# Patient Record
Sex: MALE | HISPANIC OR LATINO
[De-identification: names, ages, dates, MRNs, and addresses within clinical notes are randomized per-mention and may not be internally consistent; named-entity substitution may affect disease eponyms.]

---

## 2023-02-28 ENCOUNTER — APPOINTMENT (OUTPATIENT)
Dept: ORTHOPEDIC SURGERY | Facility: CLINIC | Age: 28
End: 2023-02-28
Payer: MEDICAID

## 2023-02-28 PROBLEM — Z00.00 ENCOUNTER FOR PREVENTIVE HEALTH EXAMINATION: Status: ACTIVE | Noted: 2023-02-28

## 2023-02-28 PROCEDURE — 73030 X-RAY EXAM OF SHOULDER: CPT | Mod: RT

## 2023-02-28 PROCEDURE — 99203 OFFICE O/P NEW LOW 30 MIN: CPT

## 2023-02-28 NOTE — HISTORY OF PRESENT ILLNESS
[de-identified] : Pt here for evaluation for right shoulder pain. \par \par He states that he had an operation to his right shoulder in [facility] approximately 1 year ago and they was supposed to remove the hardware.  However, [place] and still has a hardware in his right shoulder.  He states he had an injury to his right clavicle and AC joint.\par \par He is having significant pain over the incisional site and palpable hardware\par \par NAD\par Right shoulder:\par Incision over distal clavicle and AC joint well-healed\par Palpable hardware superior to ac joint\par Pain with ROM\par Negative instability\par \par XRay right shoulder shows cerclage wiring from the acromion through to the distal clavicle with a broken K wires crossing the AC joint\par \par Plan\par Through an  I went over findings with the patient and his family.  I explained that he needs to hardware removed as this is causing him significant pain.  However he wants this done as soon as possible.  I explained to him that I am more than happy to provide the treatment but due to his surgical booking I am unable to give him a specific date and couple weeks.  He is interested in seeing a second opinion to see if it could be done sooner.  He will have a follow-up appointment with either Dr. Chavis or Dr. Castillo to see if they can accommodate him sooner for the procedure

## 2023-03-08 ENCOUNTER — APPOINTMENT (OUTPATIENT)
Dept: ORTHOPEDIC SURGERY | Facility: CLINIC | Age: 28
End: 2023-03-08

## 2023-03-15 ENCOUNTER — APPOINTMENT (OUTPATIENT)
Dept: ORTHOPEDIC SURGERY | Facility: CLINIC | Age: 28
End: 2023-03-15
Payer: MEDICAID

## 2023-03-15 DIAGNOSIS — M25.511 PAIN IN RIGHT SHOULDER: ICD-10-CM

## 2023-03-15 PROCEDURE — 99214 OFFICE O/P EST MOD 30 MIN: CPT

## 2023-03-17 ENCOUNTER — TRANSCRIPTION ENCOUNTER (OUTPATIENT)
Age: 28
End: 2023-03-17

## 2023-03-17 ENCOUNTER — OUTPATIENT (OUTPATIENT)
Dept: INPATIENT UNIT | Facility: HOSPITAL | Age: 28
LOS: 1 days | Discharge: HOME | End: 2023-03-17
Payer: MEDICAID

## 2023-03-17 ENCOUNTER — APPOINTMENT (OUTPATIENT)
Dept: ORTHOPEDIC SURGERY | Facility: HOSPITAL | Age: 28
End: 2023-03-17
Payer: MEDICAID

## 2023-03-17 VITALS
OXYGEN SATURATION: 98 % | SYSTOLIC BLOOD PRESSURE: 121 MMHG | TEMPERATURE: 98 F | HEART RATE: 57 BPM | DIASTOLIC BLOOD PRESSURE: 75 MMHG | RESPIRATION RATE: 18 BRPM

## 2023-03-17 VITALS — OXYGEN SATURATION: 97 % | SYSTOLIC BLOOD PRESSURE: 122 MMHG | DIASTOLIC BLOOD PRESSURE: 74 MMHG | HEART RATE: 61 BPM

## 2023-03-17 DIAGNOSIS — Z98.890 OTHER SPECIFIED POSTPROCEDURAL STATES: Chronic | ICD-10-CM

## 2023-03-17 DIAGNOSIS — T84.84XA PAIN DUE TO INTERNAL ORTHOPEDIC PROSTHETIC DEVICES, IMPLANTS AND GRAFTS, INITIAL ENCOUNTER: ICD-10-CM

## 2023-03-17 PROCEDURE — 20680 REMOVAL OF IMPLANT DEEP: CPT | Mod: RT

## 2023-03-17 PROCEDURE — 87070 CULTURE OTHR SPECIMN AEROBIC: CPT

## 2023-03-17 PROCEDURE — 73030 X-RAY EXAM OF SHOULDER: CPT | Mod: RT

## 2023-03-17 RX ORDER — ACETAMINOPHEN 500 MG
2 TABLET ORAL
Qty: 112 | Refills: 0
Start: 2023-03-17 | End: 2023-03-30

## 2023-03-17 RX ORDER — HYDROMORPHONE HYDROCHLORIDE 2 MG/ML
0.5 INJECTION INTRAMUSCULAR; INTRAVENOUS; SUBCUTANEOUS
Refills: 0 | Status: DISCONTINUED | OUTPATIENT
Start: 2023-03-17 | End: 2023-03-17

## 2023-03-17 RX ORDER — KETOROLAC TROMETHAMINE 30 MG/ML
1 SYRINGE (ML) INJECTION
Qty: 21 | Refills: 0
Start: 2023-03-17 | End: 2023-03-23

## 2023-03-17 RX ORDER — OXYCODONE HYDROCHLORIDE 5 MG/1
5 TABLET ORAL ONCE
Refills: 0 | Status: DISCONTINUED | OUTPATIENT
Start: 2023-03-17 | End: 2023-03-17

## 2023-03-17 NOTE — ASU DISCHARGE PLAN (ADULT/PEDIATRIC) - ASU DC SPECIAL INSTRUCTIONSFT
keep dressing on and keep clean and dry  take tylenol and ketorolac for pain  no heavy lifting  follow up 3/29/2023

## 2023-03-17 NOTE — H&P ADULT - ASSESSMENT
26 yo male w/ right clavicle broken hardware s/p ac joint separation and surgical fixation in [facility] approx 1 year ago. Here for removal of hardware

## 2023-03-17 NOTE — ASU DISCHARGE PLAN (ADULT/PEDIATRIC) - CARE PROVIDER_API CALL
Santos Chavis)  Orthopaedic Surgery  [address] [place] [address]  Phone: [phone]  Fax: [phone]  Established Patient  Follow Up Time: Routine

## 2023-03-17 NOTE — ASU DISCHARGE PLAN (ADULT/PEDIATRIC) - NS MD DC FALL RISK RISK
For information on Fall & Injury Prevention, visit: [email or web address] OR  [email or web address] OR  https://www.cdc.gov/steadi/patient.html

## 2023-03-17 NOTE — PRE-ANESTHESIA EVALUATION ADULT - WEIGHT IN KG
Chief Complaint   Patient presents with    Physical    Labs     Patient in office today for cpe and fasting labs. Have no concerns. 1. Have you been to the ER, urgent care clinic since your last visit? Hospitalized since your last visit? No    2. Have you seen or consulted any other health care providers outside of the [address] [place] [address] since your last visit? Include any pap smears or colon screening.  No Detail Level: Detailed Depth Of Biopsy: dermis Was A Bandage Applied: Yes Size Of Lesion In Cm: 0.4 X Size Of Lesion In Cm: 0 Biopsy Type: H and E Biopsy Method: Dermablade Anesthesia Type: 1% lidocaine with epinephrine Anesthesia Volume In Cc (Will Not Render If 0): 0.5 74 Hemostasis: Drysol Wound Care: Petrolatum Dressing: bandage Destruction After The Procedure: No Type Of Destruction Used: Curettage Curettage Text: The wound bed was treated with curettage after the biopsy was performed. Cryotherapy Text: The wound bed was treated with cryotherapy after the biopsy was performed. Electrodesiccation Text: The wound bed was treated with electrodesiccation after the biopsy was performed. Electrodesiccation And Curettage Text: The wound bed was treated with electrodesiccation and curettage after the biopsy was performed. Silver Nitrate Text: The wound bed was treated with silver nitrate after the biopsy was performed. Lab: [id number] Lab Facility: [id number] Consent: Written consent was obtained and risks were reviewed including but not limited to scarring, infection, bleeding, scabbing, incomplete removal, nerve damage and allergy to anesthesia. Post-Care Instructions: I reviewed with the patient in detail post-care instructions. Patient is to keep the biopsy site dry overnight, and then apply bacitracin twice daily until healed. Patient may apply hydrogen peroxide soaks to remove any crusting. Notification Instructions: Patient will be notified of biopsy results. However, patient instructed to call the office if not contacted within 2 weeks. Billing Type: Third-Party Bill Size Of Lesion In Cm: 0.3 Lab: [id number] Lab Facility: [id number] Billing Type: Third-Party Bill

## 2023-03-17 NOTE — H&P ADULT - HISTORY OF PRESENT ILLNESS
28 yo male s/p right AC joint separation and surgery 1 year ago in [facility]. Was doing well until 2 weeks ago when he fell and has tenderness and pain to right distal clavicle; xrays demonstrate broken hardware.

## 2023-03-17 NOTE — BRIEF OPERATIVE NOTE - OPERATION/FINDINGS
right clavicle tension band hardware broken preoperatively. Part of the hardware was removed. The broken wires were left in place

## 2023-03-20 LAB
CULTURE RESULTS: SIGNIFICANT CHANGE UP
CULTURE RESULTS: SIGNIFICANT CHANGE UP
SPECIMEN SOURCE: SIGNIFICANT CHANGE UP
SPECIMEN SOURCE: SIGNIFICANT CHANGE UP
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
## 2023-03-21 DIAGNOSIS — T84.298A OTHER MECHANICAL COMPLICATION OF INTERNAL FIXATION DEVICE OF OTHER BONES, INITIAL ENCOUNTER: ICD-10-CM
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
## 2023-03-27 ENCOUNTER — APPOINTMENT (OUTPATIENT)
Dept: ORTHOPEDIC SURGERY | Facility: CLINIC | Age: 28
End: 2023-03-27
Payer: MEDICAID

## 2023-03-27 PROCEDURE — 99024 POSTOP FOLLOW-UP VISIT: CPT

## 2023-03-27 PROCEDURE — 73030 X-RAY EXAM OF SHOULDER: CPT | Mod: RT

## 2023-03-28 PROBLEM — Z78.9 OTHER SPECIFIED HEALTH STATUS: Chronic | Status: ACTIVE | Noted: 2023-03-17

## 2023-04-10 ENCOUNTER — APPOINTMENT (OUTPATIENT)
Dept: ORTHOPEDIC SURGERY | Facility: CLINIC | Age: 28
End: 2023-04-10

## 2023-05-01 ENCOUNTER — APPOINTMENT (OUTPATIENT)
Dept: INTERNAL MEDICINE | Facility: CLINIC | Age: 28
End: 2023-05-01

## 2023-06-28 ENCOUNTER — NON-APPOINTMENT (OUTPATIENT)
Age: 28
End: 2023-06-28

## 2023-06-29 ENCOUNTER — APPOINTMENT (OUTPATIENT)
Dept: UROLOGY | Facility: CLINIC | Age: 28
End: 2023-06-29

## 2023-07-31 NOTE — ASU PATIENT PROFILE, ADULT - ACCEPTABLE
Patient Reported symptoms:    Right leg   Heaviness Some of the time   Achiness All of the time  Swelling A little of the time   Throbbing None of the time   Itching None of the time   Appearance Very noticeable   Impact on work/activities Moderately reduced    Left Leg   Heaviness Some of the time   Achiness Some of the time   Swelling A little of the time   Throbbing None of the time   Itching None of the time   Appearance Very noticeable   Impact on work/activities Moderately reduced    0

## 2024-01-20 ENCOUNTER — EMERGENCY (EMERGENCY)
Facility: HOSPITAL | Age: 29
LOS: 0 days | Discharge: HOME | End: 2024-01-21
Attending: EMERGENCY MEDICINE
Payer: COMMERCIAL

## 2024-01-20 VITALS
RESPIRATION RATE: 18 BRPM | TEMPERATURE: 99 F | WEIGHT: 154.32 LBS | OXYGEN SATURATION: 97 % | HEART RATE: 72 BPM | DIASTOLIC BLOOD PRESSURE: 70 MMHG | SYSTOLIC BLOOD PRESSURE: 131 MMHG

## 2024-01-20 DIAGNOSIS — J02.9 ACUTE PHARYNGITIS, UNSPECIFIED: ICD-10-CM

## 2024-01-20 DIAGNOSIS — R04.2 HEMOPTYSIS: ICD-10-CM

## 2024-01-20 DIAGNOSIS — Z98.890 OTHER SPECIFIED POSTPROCEDURAL STATES: Chronic | ICD-10-CM

## 2024-01-20 PROCEDURE — 71046 X-RAY EXAM CHEST 2 VIEWS: CPT | Mod: 26

## 2024-01-20 PROCEDURE — 99283 EMERGENCY DEPT VISIT LOW MDM: CPT | Mod: 25

## 2024-01-20 PROCEDURE — 99284 EMERGENCY DEPT VISIT MOD MDM: CPT

## 2024-01-20 PROCEDURE — 71046 X-RAY EXAM CHEST 2 VIEWS: CPT

## 2024-01-20 RX ORDER — DEXAMETHASONE 0.5 MG/5ML
10 ELIXIR ORAL ONCE
Refills: 0 | Status: COMPLETED | OUTPATIENT
Start: 2024-01-20 | End: 2024-01-20

## 2024-01-20 RX ADMIN — Medication 10 MILLIGRAM(S): at 21:46

## 2024-01-20 NOTE — ED PROVIDER NOTE - ATTENDING CONTRIBUTION TO CARE
20-year-old male to the ED with sore throat and cough with bloody sputum over the past few days.  Wife is admitted to the hospital with similar symptoms.  No sick contacts or travels or trauma.  No smoking history and no bleeding from any other sites.  On exam patient has significantly reddened posterior pharynx with pain over bilateral tonsils and redness

## 2024-01-20 NOTE — ED PROVIDER NOTE - PATIENT PORTAL LINK FT
You can access the FollowMyHealth Patient Portal offered by [facility] by registering at the following website: [email or web address]. By joining [employer]’s FollowMyHealth portal, you will also be able to view your health information using other applications (apps) compatible with our system.

## 2024-01-20 NOTE — ED PROVIDER NOTE - PHYSICAL EXAMINATION
VITAL SIGNS: I have reviewed nursing notes and confirm.  CONSTITUTIONAL: well-appearing, non-toxic, NAD  SKIN: Warm dry, normal skin turgor  HEAD: NCAT  EYES: EOMI, PERRLA, no scleral icterus  ENT: Moist mucous membranes, normal pharynx with no exudates. (+) erythema  NECK: Supple; non tender. Full ROM.   CARD: RRR, no murmurs, rubs or gallops  RESP: clear to ausculation b/l.  No rales, rhonchi, or wheezing.  ABD: soft, + BS, non-tender, non-distended, no rebound or guarding. No CVA tenderness.  EXT: Full ROM, no bony tenderness, no pedal edema, no calf tenderness  NEURO: normal motor. normal sensory. CN II-XII intact. Normal gait.  PSYCH: Cooperative, appropriate. VITAL SIGNS: I have reviewed nursing notes and confirm.  CONSTITUTIONAL: well-appearing, non-toxic, NAD  SKIN: Warm dry, normal skin turgor  HEAD: NCAT  EYES: EOMI, PERRLA, no scleral icterus  ENT: Moist mucous membranes, normal pharynx with no exudates. (+) erythema in posterior pharynx  NECK: Supple; non tender. Full ROM.   CARD: RRR, no murmurs, rubs or gallops  RESP: clear to ausculation b/l.  No rales, rhonchi, or wheezing.  ABD: soft, + BS, non-tender, non-distended, no rebound or guarding. No CVA tenderness.  EXT: Full ROM, no bony tenderness, no pedal edema, no calf tenderness  NEURO: normal motor. normal sensory. CN II-XII intact. Normal gait.  PSYCH: Cooperative, appropriate.

## 2024-01-20 NOTE — ED PROVIDER NOTE - OBJECTIVE STATEMENT
Patient is a 28-year-old male no past medical history presenting for evaluation for sore throat for the last 2 days.  Patient states that he has also had episodes of hemoptysis.  He denies any recent travel, prolonged travel, shortness of breath, difficulty breathing, fevers, chills, nausea, vomiting.  Patient has been using Tylenol with minimal relief. [facility] #[id number]    Patient is a 28-year-old male no past medical history presenting for evaluation for sore throat for the last 2 days.  Patient states that he has also had episodes of hemoptysis.  He denies any recent travel, prolonged travel, shortness of breath, difficulty breathing, fevers, chills, nausea, vomiting.  Patient has been using Tylenol with minimal relief.

## 2024-03-13 ENCOUNTER — APPOINTMENT (OUTPATIENT)
Dept: UROLOGY | Facility: CLINIC | Age: 29
End: 2024-03-13

## 2024-08-01 ENCOUNTER — EMERGENCY (EMERGENCY)
Facility: HOSPITAL | Age: 29
LOS: 0 days | Discharge: HOME | End: 2024-08-01
Attending: EMERGENCY MEDICINE
Payer: SELF-PAY

## 2024-08-01 VITALS
SYSTOLIC BLOOD PRESSURE: 129 MMHG | RESPIRATION RATE: 19 BRPM | DIASTOLIC BLOOD PRESSURE: 66 MMHG | OXYGEN SATURATION: 98 % | HEART RATE: 69 BPM | TEMPERATURE: 98 F

## 2024-08-01 DIAGNOSIS — Z98.890 OTHER SPECIFIED POSTPROCEDURAL STATES: Chronic | ICD-10-CM

## 2024-08-01 DIAGNOSIS — M54.2 CERVICALGIA: ICD-10-CM
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
## 2024-08-01 DIAGNOSIS — R20.0 ANESTHESIA OF SKIN: ICD-10-CM

## 2024-08-01 PROCEDURE — 99284 EMERGENCY DEPT VISIT MOD MDM: CPT

## 2024-08-01 PROCEDURE — 72125 CT NECK SPINE W/O DYE: CPT | Mod: MC

## 2024-08-01 PROCEDURE — 72125 CT NECK SPINE W/O DYE: CPT | Mod: 26,MC

## 2024-08-01 PROCEDURE — 96372 THER/PROPH/DIAG INJ SC/IM: CPT

## 2024-08-01 PROCEDURE — 99284 EMERGENCY DEPT VISIT MOD MDM: CPT | Mod: 25

## 2024-08-01 RX ORDER — TIZANIDINE HCL 2 MG
2 TABLET ORAL
Qty: 18 | Refills: 0
Start: 2024-08-01 | End: 2024-08-03

## 2024-08-01 RX ORDER — KETOROLAC TROMETHAMINE 30 MG/ML
30 INJECTION, SOLUTION INTRAMUSCULAR ONCE
Refills: 0 | Status: DISCONTINUED | OUTPATIENT
Start: 2024-08-01 | End: 2024-08-01

## 2024-08-01 RX ORDER — METHOCARBAMOL 500 MG
1500 TABLET ORAL ONCE
Refills: 0 | Status: COMPLETED | OUTPATIENT
Start: 2024-08-01 | End: 2024-08-01

## 2024-08-01 RX ORDER — DEXAMETHASONE 1 MG/1
6 TABLET ORAL ONCE
Refills: 0 | Status: COMPLETED | OUTPATIENT
Start: 2024-08-01 | End: 2024-08-01

## 2024-08-01 RX ADMIN — Medication 1500 MILLIGRAM(S): at 20:54

## 2024-08-01 RX ADMIN — DEXAMETHASONE 6 MILLIGRAM(S): 1 TABLET ORAL at 20:54

## 2024-08-01 RX ADMIN — KETOROLAC TROMETHAMINE 30 MILLIGRAM(S): 30 INJECTION, SOLUTION INTRAMUSCULAR at 20:55

## 2024-08-01 NOTE — ED ADULT NURSE NOTE - NSSUHOSCREENINGYN_ED_ALL_ED
Thank you for choosing our [facility] or [facility]  [facility] practice. We are committed to your medical treatment and  care. If you need to reschedule or cancel your surgery or follow up  appointment, please call the surgery scheduler at [phone]. INSTRUCTIONS FOR SURGERY BMT    Nothing to eat or drink after midnight the night before surgery unless surgery is at [facility] or otherwise instructed by the hospital.    DO NOT TAKE ANY ASPIRIN PRODUCTS 7 days prior to surgery-unless required by your cardiologist or primary care physician. Tylenol only. No Advil, Motrin, Aleve, or Ibuprofen    Any illegal drugs in your system (including Marijuana even if legally prescribed) will result in your surgery being cancelled. Please be sure to check with our office or the hospital on time frame for the drugs to be out of your system. Should your insurance change at any time you must contact our office. Failure to do so may result in your surgery being rescheduled. If you need paperwork filled out for work, you must give the office 2 weeks to complete and submit the forms. [facility]), [address], [facility], [facility] will call you the day prior to your surgery and give you further instructions, if any questions call them at [phone].      ? Pre-Surgery/Anesthesia Video ([facility] ONLY)  Located on [facility]  Steps to locate video online:  1. Scroll over Health Information  1. [employer]  2. [employer]  1. Your Child and Anesthesia  2.  [address] Restrictions ([facility] ONLY)   Food Type Stop Prior to Surgery   Solid Food/Milk Products 8 Hours   Formula 6 Hours   Breast Milk 4 Hours   Clear Liquids   (Water, Gatorade, Pedialtye) 2 Hours Yes - the patient is able to be screened

## 2024-08-01 NOTE — ED PROVIDER NOTE - OBJECTIVE STATEMENT
28 yo M with no pmhx presenting for evaluation of left neck pain radiating to left arm associated with numbness x 2 weeks. Symptoms started after he was lifting heavy object on his neck while at work. No falls. No weakness. No urinary or bowel incontinence. No cp, sob, dizziness, fevers, or chills.     : Rachelle ID #[id number]

## 2024-08-01 NOTE — ED PROVIDER NOTE - CARE PROVIDER_API CALL
Marco A Yee  Neurosurgery  [address] [place] [address], [address]  [place] [address]  Phone: [phone]  Fax: [phone]  Follow Up Time: 4-6 Days

## 2024-08-01 NOTE — ED PROVIDER NOTE - CLINICAL SUMMARY MEDICAL DECISION MAKING FREE TEXT BOX
30 yo M with pmh of right shoulder surgery presents to the ER for neck pain radiating down the left arm associated with numbness x 15 days. Pt states he and another coworker were lifting something heavy, the coworker started to hold it shakily where most of weight was placed onto the patient. Pt bending head and neck to brace this object, and since then he's had the pain. Took Tylenol with no relief. No weakness/incontinence/gait abnormality/cp/sob/HA/dizziness/abdomen pain/rash. History and exam cw with radicular type of pain. Checked CT C-spine scan given hx, given dexamethasone, ketorolac and robaxin with some improvement of discomfort. Will refer to neurosurgery as outpt for further eval and dc with tizanidine+ibuprofen. Return precautions provided.

## 2024-08-01 NOTE — ED PROVIDER NOTE - NSFOLLOWUPINSTRUCTIONS_ED_ALL_ED_FT
Nuestros coordinadores de referencias del departamento de emergencias se comunicarán con usted en las próximas 24 a  48 horas de 9:00 a. m. a 5:00 p. m. (de lunes a viernes) con sohail lorie de seguimiento. Espere sohail llamada telefónica del hospital en bebe período de tiempo. Si no recibe sohail llamada o si tiene alguna pregunta o inquietud, puede comunicarse con demondos al [phone].     Radiculopatía cervical  Cervical Radiculopathy  Close-up of the nerves of the cervical spine.  La radiculopatía cervical se presenta cuando un nervio del sriram (un nervio cervical) está comprimido o dañado. Esta afección puede ocurrir debido a sohail lesión en la columna vertebral cervical (vértebras) del sriram, o azul parte del proceso de envejecimiento normal. La compresión de los nervios cervicales puede causar dolor o adormecimiento que se extiende desde el sriram hasta el brazo y los dedos de la mano. Esta afección generalmente mejora con reposo. Si no mejora, deidra vez sea necesario administrar un tratamiento.    ¿Cuáles son las causas?  Esta afección puede ser causada por lo siguiente:  Lesión en el sriram.  Un abombamiento (hernia) discal.  Espasmos musculares.  Rigidez de los músculos del sriram debido al uso excesivo.  Artritis.  Fractura o degeneración de los huesos y las articulaciones de la columna (espondiloartrosis) debido al envejecimiento.  Espolones óseos que pueden formarse cerca de los nervios cervicales.  ¿Cuáles son los signos o síntomas?  Los síntomas de esta afección incluyen:  Dolor. El dolor puede extenderse desde el sriram hasta el brazo y la mano. El dolor puede ser intenso o molesto. Puede empeorar cuando mueve el sriram.  Adormecimiento u hormigueo en el brazo o la mano.  Debilidad en el brazo y la mano afectados, en casos graves.  ¿Cómo se diagnostica?  Esta afección se puede diagnosticar en función de los síntomas, la historia clínica y los antecedentes médicos. También pueden hacerle estudios, que incluyen los siguientes:  Radiografías.  Exploración por tomografía computarizada (TC).  Resonancia magnética (RM).  Electromiograma (EMG).  Pruebas de conducción nerviosa.  ¿Cómo se trata?  En muchos casos, no se requiere tratamiento para esta afección. Con reposo, esta suele mejorar con el tiempo. Si es necesario administrar tratamiento, las opciones pueden incluir lo siguiente:  Usar un collarín cervical blando shawn períodos cortos.  Hacer fisioterapia para fortalecer los músculos del sriram.  Usar medicamentos. Estos pueden incluir antiinflamatorios no esteroideos (SONIA), azul ibuprofeno, o corticoesteroides orales.  Aplicarse inyecciones en la columna vertebral, en los casos graves.  Someterse a sohail cirugía. [place] puede ser necesario si otros tratamientos no son eficaces. Según la causa de esta afección, podrán implementarse diferentes tipos de cirugía.  Siga estas indicaciones en cagle casa:  Si tiene un collarín cervical:    Úselo azul se lo haya indicado el médico. Quíteselo solamente azul se lo haya indicado el médico.  Pregúntele al médico si puede quitarse el collarín cervical para bañarse e higienizarse. Si lo autorizan a quitarse el collarín para bañarse o higienizarse:  Siga las instrucciones del médico acerca de cómo quitarse el collarín de manera alvarez.  Para limpiar el collarín, pásele un paño con agua y jabón suave, y séquelo trae.  Quite las almohadillas desmontables del collarín, si las tiene, cada 1 o 2 días y lávelas a mano con agua y jabón. Déjelas que se sequen por completo antes de volver a ponerlas en el collarín.  Contrólese la piel debajo del collarín para dean si hay irritación o llagas. Si presenta alguna de estas, informe a cagle médico.  Control del dolor    Bag of ice on a towel on the skin.   A heating pad for use on the painful area.  Use los medicamentos de venta juana y los recetados solamente azul se lo haya indicado el médico.  Si se lo indican, aplique hielo sobre la maite afectada. Para hacer esto:  Si tiene un collarín cervical blando, quíteselo azul se lo haya indicado el médico.  Ponga el hielo en sohail bolsa plástica.  Coloque sohail toalla entre la piel y la bolsa.  Aplique el hielo shawn 20 minutos, 2 o 3 veces por día.  Retire el hielo si la piel se pone de color nava brillante. [place] es muy importante. Si no puede sentir dolor, calor o frío, tiene un mayor riesgo de que se dañe la maite.  Si aplicarse hielo no le marla el dolor, intente aplicarse calor. Use la andrew de calor que el médico le recomiende, azul sohail compresa de calor húmedo o sohail almohadilla térmica.  Coloque sohail toalla entre la piel y la andrew de calor.  Aplique calor shawn 20 a 30 minutos.  Retire la andrew de calor si la piel se pone de color nava brillante. [place] es especialmente importante si no puede sentir dolor, calor o frío. Corre un mayor riesgo de sufrir quemaduras.  Intente darse un masaje suave en el sriram y el hombro para ayudar a aliviar los síntomas.  Actividad    Descanse todo lo que sea necesario.  Retome pastor actividades normales azul se lo haya indicado el médico. Pregúntele al médico qué actividades son seguras para usted.  Realice ejercicios de elongación y fortalecimiento azul se lo hayan indicado el médico o el fisioterapeuta.  Es posible que deba evitar levantar objetos. Pregúntele al médico cuánto peso puede levantar sin correr riesgos.  Indicaciones generales    Use sohail almohada plana para dormir.  No conduzca mientras usa un collarín cervical. Si no tiene un collarín cervical, pregúntele al médico si es seguro que conduzca shawn el proceso de curación del sriram.  Pregúntele al médico si el medicamento recetado le impide conducir o usar maquinaria.  No consuma ningún producto que contenga nicotina o tabaco. Estos productos incluyen cigarrillos, tabaco para mascar y aparatos de vapeo, azul los cigarrillos electrónicos. Si necesita ayuda para dejar de consumir estos productos, consulte al médico.  Concurra a todas las visitas de seguimiento. [place] es importante.  Comuníquese con un médico si:  La afección no mejora con tratamiento.  Solicite ayuda de inmediato si:  El dolor se intensifica mucho y no se marla con los medicamentos.  Siente debilidad o adormecimiento en la mano, el brazo, el adri o la pierna.  Tiene fiebre jarvis.  Tiene rigidez de sriram.  Pierde el control de la vejiga o los intestinos (tiene incontinencia).  Tiene dificultad para caminar, mantener el equilibrio o hablar.  Resumen  La radiculopatía cervical se presenta cuando un nervio del sriram está comprimido o dañado.  Un nervio puede pinzarse por un abultamiento discal, artritis, espasmos musculares o sohail lesión en el sriram.  Los síntomas incluyen dolor, hormigueo o adormecimiento que se irradia desde el sriram hacia el brazo o la mano. En los casos graves, también puede presentarse debilidad.  El tratamiento puede incluir reposo, fisioterapia y usar un collarín cervical. Pueden recetarle medicamentos para aliviar el dolor. En casos graves, deidra vez haya que aplicar inyecciones o realizar sohail cirugía.  Esta información no tiene azul fin reemplazar el consejo del médico. Asegúrese de hacerle al médico cualquier pregunta que tenga.

## 2024-08-01 NOTE — ED ADULT NURSE NOTE - OBJECTIVE STATEMENT
pt presented to ED c/o L neck pain after lifting furniture. pt c/o pain when turning neck to the L side. denies any sob, chest pain or N/V/D

## 2024-08-01 NOTE — ED PROVIDER NOTE - PATIENT PORTAL LINK FT
You can access the FollowMyHealth Patient Portal offered by [facility] by registering at the following website: [email or web address]. By joining [employer]’s FollowMyHealth portal, you will also be able to view your health information using other applications (apps) compatible with our system.

## 2024-08-01 NOTE — ED ADULT NURSE NOTE - CCCP TRG CHIEF CMPLNT
Mother called, Keanu Altman has been having left shin pain for weeks without any improvement. He has been very active in cross country and soccer. They have been doing stretches, using bands etc without any improvement. Concerned about stress fracture. Xray ordered of left shin. neck pain

## 2024-08-01 NOTE — ED PROVIDER NOTE - PHYSICAL EXAMINATION
VITAL SIGNS: I have reviewed nursing notes and confirm.  CONSTITUTIONAL: Patient is in no acute distress.  SKIN: Skin exam is warm and dry, no acute rash.  HEAD: Normocephalic; atraumatic.  EYES: PERRL, EOM intact; conjunctiva and sclera clear.  ENT: No nasal discharge; airway clear.   NECK: Supple; non tender.  CARD: S1, S2 normal; no murmurs, gallops, or rubs. Regular rate and rhythm.  RESP: Clear to auscultation bilaterally. No wheezes, rales or rhonchi.  ABD: Normal bowel sounds; soft; non-distended; non-tender.   EXT: Normal ROM. No edema.  LYMPH: No acute cervical adenopathy.  NEURO: Alert, oriented.  No focal deficits. Motor strength 5/5 throughout. Decreased sensation to LUE.   PSYCH: Cooperative, appropriate.

## 2025-04-01 NOTE — PRE-OP CHECKLIST - SURGICAL CONSENT
History of Present Illness:  Patient presents with left knee pain.  The patient localizes the pain diffusely.  Recently there has been concern for falls and instability.  There is increasing difficulty with activities of daily living and significant disability related to the knee pain.  The patient endorses the following failed non-operative treatments: Cortisone anti-inflammatories bracing physical therapy.   There is increasing frustration with persistent pain and swelling and decreasing distance of ambulation.  Pain is moderate, achy, diffuse.  Better with rest, worse with activity.     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  Physical work predominantly with [place].    Review of Systems   GENERAL: Negative for malaise, significant weight loss, fever  MUSCULOSKELETAL: see HPI  NEURO:  Negative    Past Medical History:   Diagnosis Date    HTN (hypertension)      Past Surgical History:   Procedure Laterality Date    APPENDECTOMY      in tenth grade       Current Outpatient Medications:     fluticasone propion-salmeteroL (Advair HFA) 45-21 mcg/actuation inhaler, Inhale 2 puffs 2 times a day. Rinse mouth with water after use to reduce aftertaste and incidence of candidiasis. Do not swallow., Disp: , Rfl:     furosemide (Lasix) 20 mg tablet, Take two tabs. One day alternating with one tab. The next, Disp: 135 tablet, Rfl: 3    metoprolol succinate XL (Toprol-XL) 25 mg 24 hr tablet, Take 0.5 tablets (12.5 mg) by mouth once daily. Do not crush or chew., Disp: 45 tablet, Rfl: 3    montelukast (Singulair) 10 mg tablet, Take 1 tablet (10 mg) by mouth once daily at bedtime., Disp: , Rfl:     valsartan (Diovan) 40 mg tablet, Take 1 tablet (40 mg) by mouth once daily., Disp: , Rfl:       Physical Examination:  Left Knee:  Skin healthy and intact  No gross swelling or ecchymosis  Alignment: neutral      Effusion: moderate       ROM: 15 degrees to 120  Crepitance with range of  motion  No pain with internal rotation of the hip  Tenderness to palpation over medial and lateral joint line and with patellar compression     No laxity to valgus stress  No laxity to varus stress  Negative Lachman´s test  Negative posterior drawer test  Mild pain with Johanna´s test  Neurovascular exam normal distally  2+ DP pulse and good cap refill    Radiographs:  Severe patellofemoral degenerative joint disease with loss of joint space, subchondral sclerosis and cystic changes, and osteophyte formation    Assessment:  Patient with left knee osteoarthritis    Plan:  We discussed the diagnosis of degenerative joint disease of the knee.  We reviewed an evidence-based approach to osteoarthritis of the knee.  We strongly encouraged low-impact aerobic activity and non-opioid analgesics.     We discussed temporary pain relief with corticosteroid injections and the associated risks.  We also discussed the conflicting evidence regarding viscosupplementation and potential long-term risks with NSAID´s.  We reviewed the role of bracing for instability and physical therapy for atrophy and gait abnormalities.  The patient elected for total knee arthroplasty.      The patient has failed to improve with multiple non-operative modalities.  There is increasing difficulty with activities of daily living and concern for falls.  The patient is endorsing severe pain and disability.      We had a lengthy discussion regarding total knee arthroplasty including the orthopaedic risks, including but not limited to: stiffness, infection, hematoma, early aseptic loosening, neurologic or vascular injury, clicking, difficulty kneeling and incomplete relief of pain.  We reviewed the medical risks, including but not limited to: deep venous thrombosis, pulmonary embolism, and cardiovascular/pulmonary issues.    We discussed the anticipated longevity of the implants and potential for revision surgery.  We also discussed anticoagulation,  rehabilitation goals, and the hospital course.  We discussed the likelihood of opioid analgesics and risks associated with them.    The next step is to obtain medical risk stratification and encouraged the pre-operative information seminar at the hospital.  We are happy to provide assistance and counseling if the patient has any additional concerns.         done